# Patient Record
Sex: MALE | Race: OTHER | HISPANIC OR LATINO | URBAN - METROPOLITAN AREA
[De-identification: names, ages, dates, MRNs, and addresses within clinical notes are randomized per-mention and may not be internally consistent; named-entity substitution may affect disease eponyms.]

---

## 2018-04-19 ENCOUNTER — EMERGENCY (EMERGENCY)
Facility: HOSPITAL | Age: 58
LOS: 1 days | Discharge: HOME | End: 2018-04-19
Attending: EMERGENCY MEDICINE | Admitting: EMERGENCY MEDICINE
Payer: COMMERCIAL

## 2018-04-19 VITALS
HEART RATE: 73 BPM | OXYGEN SATURATION: 94 % | TEMPERATURE: 99 F | RESPIRATION RATE: 17 BRPM | SYSTOLIC BLOOD PRESSURE: 102 MMHG | DIASTOLIC BLOOD PRESSURE: 62 MMHG

## 2018-04-19 VITALS
TEMPERATURE: 98 F | SYSTOLIC BLOOD PRESSURE: 106 MMHG | OXYGEN SATURATION: 95 % | DIASTOLIC BLOOD PRESSURE: 72 MMHG | RESPIRATION RATE: 16 BRPM | HEART RATE: 85 BPM

## 2018-04-19 DIAGNOSIS — Z88.0 ALLERGY STATUS TO PENICILLIN: ICD-10-CM

## 2018-04-19 DIAGNOSIS — I83.028 VARICOSE VEINS OF LEFT LOWER EXTREMITY WITH ULCER OTHER PART OF LOWER LEG: ICD-10-CM

## 2018-04-19 DIAGNOSIS — I83.892 VARICOSE VEINS OF LEFT LOWER EXTREMITY WITH OTHER COMPLICATIONS: ICD-10-CM

## 2018-04-19 PROCEDURE — 99282 EMERGENCY DEPT VISIT SF MDM: CPT | Mod: 25

## 2018-04-19 PROCEDURE — 99283 EMERGENCY DEPT VISIT LOW MDM: CPT

## 2018-04-19 NOTE — ED PROVIDER NOTE - MEDICAL DECISION MAKING DETAILS
varicose vein bleeding, hx of varicose vein, no bleeding elsewhere, no evidence of cellulitis, will apply wound dressing

## 2018-04-19 NOTE — ED ADULT TRIAGE NOTE - CHIEF COMPLAINT QUOTE
Pt BIBA with c/o a bleeding leg wound. Patient was changing his bandage and tore his varicose vein. Patient takes methadone and endorses drinking this evening.

## 2018-04-19 NOTE — ED PROVIDER NOTE - PHYSICAL EXAMINATION
CON: ao x 3, HENMT: clear oropharynx, soft neck, HEAD: atraumatic, SKIN: varicose vein noted, no active bleeding, also noted shallow wound ulcer to anterior LE, no lymphatic streaking, not warm, no erythema, no gangrene, MSK: no deformities,

## 2018-04-19 NOTE — ED ADULT NURSE NOTE - ADDITIONAL PRINTED INSTRUCTIONS GIVEN
Pt instructed to follow up with PCP and vascular surgeon and if sx worsen or new sx arise to return to ED.

## 2018-10-17 ENCOUNTER — EMERGENCY (EMERGENCY)
Facility: HOSPITAL | Age: 58
LOS: 1 days | Discharge: HOME | End: 2018-10-17
Admitting: EMERGENCY MEDICINE
Payer: COMMERCIAL

## 2018-10-17 VITALS
HEART RATE: 97 BPM | OXYGEN SATURATION: 95 % | SYSTOLIC BLOOD PRESSURE: 138 MMHG | DIASTOLIC BLOOD PRESSURE: 83 MMHG | TEMPERATURE: 98 F | RESPIRATION RATE: 20 BRPM | HEIGHT: 67 IN | WEIGHT: 145.06 LBS

## 2018-10-17 VITALS
RESPIRATION RATE: 20 BRPM | TEMPERATURE: 98 F | SYSTOLIC BLOOD PRESSURE: 125 MMHG | HEART RATE: 88 BPM | OXYGEN SATURATION: 95 % | DIASTOLIC BLOOD PRESSURE: 74 MMHG

## 2018-10-17 PROCEDURE — 93971 EXTREMITY STUDY: CPT | Mod: 26,LT

## 2018-10-17 PROCEDURE — 12001 RPR S/N/AX/GEN/TRNK 2.5CM/<: CPT

## 2018-10-17 PROCEDURE — 99284 EMERGENCY DEPT VISIT MOD MDM: CPT | Mod: 25

## 2018-10-17 PROCEDURE — 93971 EXTREMITY STUDY: CPT

## 2018-10-17 NOTE — ED ADULT NURSE NOTE - OBJECTIVE STATEMENT
pt has varicose veins and a vein on the front of his left leg began bleeding 1 hour ago.  has hx of same with last time being 4 months ago.  sees a vascular surgeon at [facility].

## 2018-10-17 NOTE — ED PROVIDER NOTE - OBJECTIVE STATEMENT
pt without significant pmhx states he had a small scrape with scab to the left shin for the past several days; tonight shortly after midnight he was changing the bandage and the scab scraped off; blood started spurting from the wound and he called 911.  EMS applied a pressure dressing and transported to ED.  Pt states last tetanus shot about 7 yrs ago.  Patient reports the left leg has been swelling slightly and sore for the past few days.  No hx of DVT/PE.   No SOB, PANDEY, CP, palpitations.  No fever or chills.    PMHx none  PSHx laparotomy for perf stomach ulcer  Meds methadone  Allergies PCN

## 2018-10-17 NOTE — ED ADULT TRIAGE NOTE - ARRIVAL INFO ADDITIONAL COMMENTS
pt reports bleeding from varicose vein on left leg after he changed the bandage today at 2pm. denies use of anticoagulants

## 2018-10-17 NOTE — ED PROVIDER NOTE - PHYSICAL EXAMINATION
CONSTITUTIONAL: WD,WN. NAD.    SKIN: Normal color and turgor. No rash.    HEAD: NC/AT.  EYES: Conjunctiva clear. EOMI. PERRL.    ENT: Airway patent, OP without erythema, tonsillar swelling or exudate; uvula midline without swelling. Nasal mucosa clear, no rhinorrhea.   RESPIRATORY:  Breathing non-labored. No retractions or accessory muscle use.  Lungs CTA bilat.  CARDIOVASCULAR:  RRR, S1S2. No M/R/G.      GI:  Abdomen soft, nontender.    MSK:   Multiple small varicose veins to lower legs.  Small varicose vein to medial aspect of distal third of left shin with steady spurting of blood.  +1 LLE edema from knee to ankle. No erythema/warmth.  NEURO: Alert and oriented; CN II-XII grossly intact. Speech clear. 5/5 strength in all extremities.  Normal balance and gait.

## 2018-10-17 NOTE — ED PROVIDER NOTE - MEDICAL DECISION MAKING DETAILS
Bleeding (spurting) varicose vein left lower leg, not controlled with pressure dressing applied by EMS.  Figure-of-eight suture controlled bleeding.  Left lower leg with mild swelling, will get US of LLE to ensure no proximal DVT causing swelling and varicose veins. Tetanus UTD. Bleeding (spurting) varicose vein left lower leg, not controlled with pressure dressing applied by EMS.  Figure-of-eight suture controlled bleeding.  Left lower leg with mild swelling, will get US of LLE to ensure no proximal DVT causing swelling and varicose veins. No evidence of cellulitis.  Tetanus UTD.

## 2018-10-17 NOTE — ED ADULT TRIAGE NOTE - NS ED TRIAGE AVPU SCALE
Alert-The patient is alert, awake and responds to voice. The patient is oriented to time, place, and person. The triage nurse is able to obtain subjective information. Surgeon Performing Repair (Optional):

## 2018-10-17 NOTE — ED ADULT NURSE NOTE - NSIMPLEMENTINTERV_GEN_ALL_ED
Implemented All Universal Safety Interventions:  [place] to call system. Call bell, personal items and telephone within reach. Instruct patient to call for assistance. Room bathroom lighting operational. Non-slip footwear when patient is off stretcher. Physically safe environment: no spills, clutter or unnecessary equipment. Stretcher in lowest position, wheels locked, appropriate side rails in place.

## 2018-10-21 DIAGNOSIS — M79.89 OTHER SPECIFIED SOFT TISSUE DISORDERS: ICD-10-CM

## 2018-10-21 DIAGNOSIS — I83.92 ASYMPTOMATIC VARICOSE VEINS OF LEFT LOWER EXTREMITY: ICD-10-CM

## 2018-10-21 DIAGNOSIS — Z88.0 ALLERGY STATUS TO PENICILLIN: ICD-10-CM

## 2021-03-13 NOTE — ED ADULT NURSE NOTE - RN DISCHARGE SIGNATURE
FOLLOW UP WITH PMD  WITHIN 1-2DAYS, CALL TO MAKE APPOINTMENT  COME BACK TO ED IF YOUR CONDITION WORSENS OR IF YOU DEVELOP FEVER GREATER THAN 100.4F, CHEST PAIN,  SHORTNESS OF BREATH OR ANY OTHER SYMPTOMS CONCERNING TO YOU  TAKE TYLENOL (ACETAMINOPHEN) 650 MG EVERY 6 HOURS AS NEEDED FOR PAIN    IF YOU WERE PRESRCIBED ANY MEDICATIONS FROM TODAY'S VISIT, TAKE THEM AS DIRECTED     Fall Prevention    WHAT YOU NEED TO KNOW:    Fall prevention includes ways to make your home and other areas safer. It also includes ways you can move more carefully to prevent a fall. Health conditions that cause changes in your blood pressure, vision, or muscle strength and coordination may increase your risk for falls. Medicines may also increase your risk for falls if they make you dizzy, weak, or sleepy.     DISCHARGE INSTRUCTIONS:    Call 911 or have someone else call if:     You have fallen and are unconscious.      You have fallen and cannot move part of your body.    Contact your healthcare provider if:     You have fallen and have pain or a headache.      You have questions or concerns about your condition or care.    Fall prevention tips:     Stand or sit up slowly. This may help you keep your balance and prevent falls.      Use assistive devices as directed. Your healthcare provider may suggest that you use a cane or walker to help you keep your balance. You may need to have grab bars put in your bathroom near the toilet or in the shower.      Wear shoes that fit well and have soles that . Wear shoes both inside and outside. Use slippers with good . Do not wear shoes with high heels.      Wear a personal alarm. This is a device that allows you to call 911 if you fall and need help. Ask your healthcare provider for more information.      Stay active. Exercise can help strengthen your muscles and improve your balance. Your healthcare provider may recommend water aerobics or walking. He or she may also recommend physical therapy to improve your coordination. Never start an exercise program without talking to your healthcare provider first.       Manage your medical conditions. Keep all appointments with your healthcare providers. Visit your eye doctor as directed.           Home safety tips:     Add items to prevent falls in the bathroom. Put nonslip strips on your bath or shower floor to prevent you from slipping. Use a bath mat if you do not have carpet in the bathroom. This will prevent you from falling when you step out of the bath or shower. Use a shower seat so you do not need to stand while you shower. Sit on the toilet or a chair in your bathroom to dry yourself and put on clothing. This will prevent you from losing your balance from drying or dressing yourself while you are standing.       Keep paths clear. Remove books, shoes, and other objects from walkways and stairs. Place cords for telephones and lamps out of the way so that you do not need to walk over them. Tape them down if you cannot move them. Remove small rugs. If you cannot remove a rug, secure it with double-sided tape. This will prevent you from tripping.       Install bright lights in your home. Use night lights to help light paths to the bathroom or kitchen. Always turn on the light before you start walking.      Keep items you use often on shelves within reach. Do not use a step stool to help you reach an item.      Paint or place reflective tape on the edges of your stairs. This will help you see the stairs better.    Follow up with your healthcare provider as directed: Write down your questions so you remember to ask them during your visits. 17-Oct-2018

## 2022-11-09 NOTE — ED ADULT NURSE NOTE - BREATHING, MLM
Your current Orthopaedic problem we are working together to treat is:  RIGHT Total knee replacement scheduled for 11/17/22 at [facility].    PREOP COVID19 TESTING  As your surgery date nears, you will get a call regarding the preoperative COVID19 test.  You will be instructed where and when to arrive for this test.  If the test is not completed, surgery will be cancelled.  Please limit contact with other individuals and avoid group settings following this test to maintain a negative status prior to your surgery. Any shopping or errands that need to be done prior to surgery, should be done prior to the COVID test.  If you need a letter to be out of work 1-2 days prior to your surgery to limit exposure, please call our office and let us know.    SURGERY  You can contact our surgery scheduler, Laly Winston, at [phone] with any further questions regarding scheduling.    Anticipate staying at the hospital 1 night with discharge to your home.  Please do a \"walk through\" and set up your home to accommodate your needs.  Remove all throw rugs to avoid tripping.  We can assist if you need medical equipment such as a raised toilet seat, a grab bar for the shower, ...etc.     WHAT WE STILL NEED FROM YOU:  Letter from cardiology  Chest X-ray (within 6 months)    **IMPORTANT DENTAL INFORMATION**  No dental appointments for the first 3 months postop unless you have an emergency.  Any dental appointment within 1 year from your date of surgery requires you to take an antibiotic prior to that dental appointment, even for a simple cleaning. Notify our office to obtain the appropriate antibiotics prior to your scheduled dental visit.    PHYSICAL THERAPY should begin 2-4 days postoperatively.  Therapy should be scheduled 2-3 times weekly, for the first 2 weeks, then twice weekly until at least 8 weeks postoperatively.  In addition to outpatient PT, you will have exercises to do at home.  Please be sure to take a  pain pill before your PT visits, someone must then drive you to PT.  Ice the joint down after your PT sessions.  Physical therapy will help your recovery.  It would be advisable to follow up with me upon completion of your therapy.     You will resume Xarelto following surgery.    We are also going to give you FRANSICO (compression) stockings after surgery.   These should be worn during the daytime for 4 weeks, ok to remove at nighttime.  The stockings are also to help increase circulation and decrease your risk of developing blood clots.    IRON SUPPLEMENTS (over the counter) are recommended after surgery for 2 weeks postop to restore IRON and blood loss, take once daily.    To help postop constipation, we recommend Senokot-S (over the counter).  This is a stool softener.    Wound Care  There is an Aquacel dressing on your wound, if intact, this is considered waterproof and can get wet in the shower.  Do not submerge or soak in bath, tub, or pool.     Remove the Aquacel dressing at 1 week postop , refer to the date written on your dressing.  Replace the dressing with 4x4 gauze and paper tape to keep clean and dry, ok to leave open to air on occasion.  Once staples are visible, DO NOT GET WET in the shower.  Use plastic/ cling wrap to keep the wound dry in the shower if necessary.    It is recommended you keep your follow-up appointment with Radha Milner PA-C at 2 weeks postoperatively for wound check and staple removal.  Your goal for motion is 0-90 degrees bending at 2 weeks postoperatively, 0-120 degrees bending at 6 weeks postoperatively.    Refills on medication:  Please give our office 1-2 days advance notice if you require a refill.  Call us at [phone] to request a refill.  Per our Orthopedic policy, refills will NOT be given on the weekend.  Please plan ahead and request refills Monday through Friday during office hours.    Office hours are 8:00 am to 5:00 pm Monday through Friday. If it is urgent that you  speak with someone outside of these hours, our [facility] Call Center will be able to assist you. You can reach the office by calling the [facility] - scheduling line at [phone].    Thank you for choosing [facility] as your Orthopaedic provider!                Spontaneous, unlabored and symmetrical